# Patient Record
Sex: FEMALE | Race: BLACK OR AFRICAN AMERICAN | NOT HISPANIC OR LATINO
[De-identification: names, ages, dates, MRNs, and addresses within clinical notes are randomized per-mention and may not be internally consistent; named-entity substitution may affect disease eponyms.]

---

## 2019-05-21 PROBLEM — Z00.00 ENCOUNTER FOR PREVENTIVE HEALTH EXAMINATION: Status: ACTIVE | Noted: 2019-05-21

## 2019-05-22 ENCOUNTER — APPOINTMENT (OUTPATIENT)
Dept: NEUROLOGY | Facility: CLINIC | Age: 54
End: 2019-05-22
Payer: MEDICAID

## 2019-05-22 VITALS
DIASTOLIC BLOOD PRESSURE: 80 MMHG | SYSTOLIC BLOOD PRESSURE: 178 MMHG | WEIGHT: 140 LBS | HEIGHT: 64 IN | HEART RATE: 69 BPM | BODY MASS INDEX: 23.9 KG/M2

## 2019-05-22 DIAGNOSIS — Z82.49 FAMILY HISTORY OF ISCHEMIC HEART DISEASE AND OTHER DISEASES OF THE CIRCULATORY SYSTEM: ICD-10-CM

## 2019-05-22 DIAGNOSIS — Z83.3 FAMILY HISTORY OF DIABETES MELLITUS: ICD-10-CM

## 2019-05-22 DIAGNOSIS — Z78.9 OTHER SPECIFIED HEALTH STATUS: ICD-10-CM

## 2019-05-22 DIAGNOSIS — Z86.79 PERSONAL HISTORY OF OTHER DISEASES OF THE CIRCULATORY SYSTEM: ICD-10-CM

## 2019-05-22 DIAGNOSIS — Z82.5 FAMILY HISTORY OF ASTHMA AND OTHER CHRONIC LOWER RESPIRATORY DISEASES: ICD-10-CM

## 2019-05-22 DIAGNOSIS — M25.50 PAIN IN UNSPECIFIED JOINT: ICD-10-CM

## 2019-05-22 DIAGNOSIS — Z86.69 PERSONAL HISTORY OF OTHER DISEASES OF THE NERVOUS SYSTEM AND SENSE ORGANS: ICD-10-CM

## 2019-05-22 DIAGNOSIS — Z86.59 PERSONAL HISTORY OF OTHER MENTAL AND BEHAVIORAL DISORDERS: ICD-10-CM

## 2019-05-22 PROCEDURE — 99205 OFFICE O/P NEW HI 60 MIN: CPT

## 2019-05-22 NOTE — HISTORY OF PRESENT ILLNESS
[FreeTextEntry1] : She is accompanied today by her daughter who is helping to translate as her primary language is Farsi.\par \par Her daughter reports tremor of her head that has been occurring for about two to three years.\par It rarely bothers her. \par Stress/anxiety seems to make it worse.\par She r has not noticed any worsening with caffeine.\par She does not drink alcohol.\par She has no tremors in her hands, balance difficulties or difficulty swallowing. \par She sometimes feels stiff and slow in the morning.\par She believes that her father had similar tremors of the head. \par \par She has difficulty sleeping. She reports that this is worse when she is stressed out.\par She sleeps for about 4-5 hours per night. She drinks a lot of tea throughout the day and is trying to switch it to caffeine free tea. She started taking melatonin which she thinks is helping.\par She is aware of snoring. She denies waking up feeling like she is choking/gasping for air.\par \par Her daughter says that she is forgetful. She will tell a story and hours later she will repeat the same story and not remember that she already told the story.\par \par She lives with her brother and her son. \par She is independent in her ADLs.\par She does not drive. \par She does not get lost or disoriented in familiar places. \par She moved to the  from [place] in 1991. She went through 12th grade in [facility] and had 6 months of college before she got  and had children. \par She has urinary frequency but no urinary incontinence. \par \par She has significant anxiety and has "borderline depression".\par Sometimes she will have panic attacks in the shower.\par She has been resistant to seeking treatment.\par \par She has no history of asthma but she typically has low blood pressure.\par \par She has diffuse body pain and joint pain.

## 2019-05-22 NOTE — REVIEW OF SYSTEMS
[As Noted in HPI] : as noted in HPI [Sleep Disturbances] : sleep disturbances [Anxiety] : anxiety [Depression] : depression [Arthralgias] : arthralgias [Joint Pain] : joint pain [Negative] : Genitourinary [de-identified] : imbalance, tremors of head, incoordination, impaired memory [de-identified] : stressed out

## 2019-05-22 NOTE — CONSULT LETTER
[Dear  ___] : Dear  [unfilled], [Consult Letter:] : I had the pleasure of evaluating your patient, [unfilled]. [Please see my note below.] : Please see my note below. [Consult Closing:] : Thank you very much for allowing me to participate in the care of this patient.  If you have any questions, please do not hesitate to contact me. [FreeTextEntry2] : Joya Acosta [FreeTextEntry3] : Sincerely,\par \par \par Mirella Ryder MD\par Diplomate, American Academy of Psychiatry and Neurology\par Board Certified in the Subspecialty of Clinical Neurophysiology\par Board Certified in the Subspecialty of Sleep Medicine\par Board Certified in the Subspecialty of Epilepsy\par

## 2019-05-22 NOTE — DISCUSSION/SUMMARY
[FreeTextEntry1] : Ms. Ramirez is a 53 year old woman with concerns of head tremor, memory difficulties, anxiety, joint pain and insomnia.\par \par 1. Tremors - Mild head titubation. No evidence of Parkinsonism. The symptoms are not bothersome to her at this time. Will not start propranolol as her daughter reports that blood pressure typically is low.\par Will check TSH and CT brain. \par \par 2. Memory difficulties - short term memory impairment. It is unclear if there is truly a memory disturbance r if it is more related to anxiety.\par She is not willing to have MRI brain. Will get CT scan to rule out hydrocephalus. \par Check TSH, vitamin B1, vitamin B12. Check for sleep apnea.\par \par 3. Snoring and memory impairment. Will get home sleep study to evaluate for RADHAMES.\par \par 4. Insomnia\par We discussed the following recommendations to improve sleep hygiene and insomnia.\par - The bed should only be used for sleep and intimacy. No reading or watching television in bed.\par -  Avoid trying to sleep/go to bed only when sleepy. If you cannot fall asleep at the beginning of the night or in the middle of the night get out of bed after 15 minutes and do something relaxing (read, watch television, etc.)\par -  Wake up at the same time every day.\par -  No naps during the day\par -  No caffeine after noon \par -  Do not watch the clock at night.\par - Avoid direct sunlight late in the day/ wear sunglasses after 4 PM\par - Do not use the computer/tablets for 1-2 hours prior to bedtime\par - Schedule thinking time early in the evening and have relaxation time for one hour before bed\par - Practice relaxation training that can be done at night if you cannot sleep\par - Exercise for 20 minutes in the late afternoon/early evening or take a hot bath 2-4 hours before bedtime\par \par She can continue to use melatonin.\par \par 5. Mood disturbance - she has significant anxiety and some depression. Trial of sertraline 25 mg daily. We discussed potential side effects including possible worsening of mood symptoms/suicidality. Her daughter was instructed to call if this should occur. \par \par 6. Joint pain - will check ESR, CRP, lyme, RF, DALI.\par \par Follow-up in one month, sooner if needed.

## 2019-06-04 ENCOUNTER — FORM ENCOUNTER (OUTPATIENT)
Age: 54
End: 2019-06-04

## 2019-06-05 ENCOUNTER — APPOINTMENT (OUTPATIENT)
Dept: CT IMAGING | Facility: CLINIC | Age: 54
End: 2019-06-05
Payer: MEDICAID

## 2019-06-05 ENCOUNTER — APPOINTMENT (OUTPATIENT)
Dept: NEUROLOGY | Facility: CLINIC | Age: 54
End: 2019-06-05
Payer: MEDICAID

## 2019-06-05 ENCOUNTER — OUTPATIENT (OUTPATIENT)
Dept: OUTPATIENT SERVICES | Facility: HOSPITAL | Age: 54
LOS: 1 days | End: 2019-06-05
Payer: MEDICAID

## 2019-06-05 DIAGNOSIS — Z00.8 ENCOUNTER FOR OTHER GENERAL EXAMINATION: ICD-10-CM

## 2019-06-05 DIAGNOSIS — R41.3 OTHER AMNESIA: ICD-10-CM

## 2019-06-05 PROCEDURE — 70450 CT HEAD/BRAIN W/O DYE: CPT | Mod: 26

## 2019-06-05 PROCEDURE — 95806 SLEEP STUDY UNATT&RESP EFFT: CPT

## 2019-06-05 PROCEDURE — 70450 CT HEAD/BRAIN W/O DYE: CPT

## 2019-06-14 ENCOUNTER — APPOINTMENT (OUTPATIENT)
Dept: NEUROLOGY | Facility: CLINIC | Age: 54
End: 2019-06-14
Payer: MEDICAID

## 2019-06-14 VITALS
DIASTOLIC BLOOD PRESSURE: 70 MMHG | BODY MASS INDEX: 23.56 KG/M2 | HEART RATE: 64 BPM | HEIGHT: 64 IN | WEIGHT: 138 LBS | SYSTOLIC BLOOD PRESSURE: 142 MMHG

## 2019-06-14 DIAGNOSIS — R06.83 SNORING: ICD-10-CM

## 2019-06-14 PROCEDURE — 99214 OFFICE O/P EST MOD 30 MIN: CPT

## 2019-06-14 NOTE — DATA REVIEWED
[de-identified] : Blood tests 5/22/19: ESR 27, HbA1C 6, TSH 2.04, free T4 1.01, T3 90, DALI 0.1, RF 8.1, Lyme negative, C-RP less than 0.4, dsDNA negative\par \par CT head 6/5/19: no evidence of acute hemorrhage, mass or mass effect.\par \par Home sleep study 6/5/19: AHI 20.8. positional influence did not appear to be a significant factor.

## 2019-06-14 NOTE — DISCUSSION/SUMMARY
[FreeTextEntry1] : Ms. Ramirez is a 53 year old woman with concerns of head tremor, memory difficulties, anxiety, joint pain and insomnia.\par \par 1. Tremors - Mild head titubation. No evidence of Parkinsonism. Symptoms have improved, likely due to improvement in anxiety. TSH normal. Will continue to monitor.\par \par 2. Memory difficulties - CT head and memory related labs are unremarkable. We can consider MRI brain in the future (at this time she is reluctant).\par She does have obstructive sleep apnea which we discussed can contribute to memory impairment. this will be addressed. \par \par \par 3. Obstructive sleep apnea - Her home sleep study showed evidence of a moderate degree of RADHAMES.\par We discussed the risks of untreated sleep apnea including sleepiness, hypertension, increased risk for heart disease and stroke, worsening seizure frequency and cognitive impairment.\par We discussed different treatments for RADHAMES including CPAP, oral appliance, surgery and weight loss. \par She is not a candidate for an oral appliance due to the fact that she wears dentures.\par She is willing to try auto PAP. This will be ordered through [facility].\par If she does not tolerate PAP, ENT evaluation can be considered. \par \par 4. Insomnia - Currently much improved, likely due to improvement in anxiety. \par \par 5. Mood disturbance - Continue low dose sertraline - 25 mg daily which seems to be providing benefit. The dose can be increased if needed. \par \par \par Follow-up in two months, sooner if needed. \par

## 2019-06-14 NOTE — PHYSICAL EXAM
[FreeTextEntry1] : Examination:\par Constitutional: normal, no apparent distress\par Eyes: normal conjunctiva b/l, no ptosis, visual fields full\par oropharynx: narrow\par Respiratory: no respiratory distress, normal effort, normal auscultation\par Cardiovascular: normal rate, rhythm, no murmurs\par Neck: supple, no masses\par Vascular: carotids normal\par Skin: normal color, no rashes\par Psych: normal mood, affect\par \par Neurological:\par Memory: normal memory, oriented to person, place, time\par Language intact/no aphasia\par Cranial Nerves: II-XII intact, Pupils equally round and reactive to light, ocular muscles/movements intact, no ptosis, no facial weakness, tongue protrudes normally in the midline, \par Motor: normal tone, no pronator drift, full strength in all four extremities in the proximal and distal muscle groups\par Coordination: Fine motor movements intact, rapid alternating movements intact, finger to nose intact bilaterally\par Sensory: intact to light touch\par DTRs: symmetric, normal\par Gait: narrow based, steady\par

## 2019-06-14 NOTE — HISTORY OF PRESENT ILLNESS
[FreeTextEntry1] : I last saw Ms. Ramirez on 5/22/19.\par She us accompanied today by her daughter. \par She says that she initially had some headaches when she first started sertraline but the headaches are now improved.\par Her daughter thinks that she is less anxious.\par She is sleeping a lot more. She is able to sleep from 9-10 PM until 6 AM.\par Her daughter thinks that tremors have been less frequent and she agrees that this is the case. \par She continues to have knee pain, joint pain.

## 2019-06-14 NOTE — CONSULT LETTER
[Dear  ___] : Dear  [unfilled], [Please see my note below.] : Please see my note below. [Consult Letter:] : I had the pleasure of evaluating your patient, [unfilled]. [Consult Closing:] : Thank you very much for allowing me to participate in the care of this patient.  If you have any questions, please do not hesitate to contact me. [FreeTextEntry2] : Joya Acosta [FreeTextEntry3] : Sincerely,\par \par \par Mirella Ryder MD\par Diplomate, American Academy of Psychiatry and Neurology\par Board Certified in the Subspecialty of Clinical Neurophysiology\par Board Certified in the Subspecialty of Sleep Medicine\par Board Certified in the Subspecialty of Epilepsy\par

## 2019-08-19 ENCOUNTER — APPOINTMENT (OUTPATIENT)
Dept: NEUROLOGY | Facility: CLINIC | Age: 54
End: 2019-08-19
Payer: MEDICAID

## 2019-08-19 VITALS
DIASTOLIC BLOOD PRESSURE: 83 MMHG | BODY MASS INDEX: 23.56 KG/M2 | HEIGHT: 64 IN | SYSTOLIC BLOOD PRESSURE: 131 MMHG | WEIGHT: 138 LBS | HEART RATE: 58 BPM

## 2019-08-19 DIAGNOSIS — G47.00 INSOMNIA, UNSPECIFIED: ICD-10-CM

## 2019-08-19 PROCEDURE — 99213 OFFICE O/P EST LOW 20 MIN: CPT

## 2019-08-19 NOTE — DATA REVIEWED
[de-identified] : Blood tests 5/22/19: ESR 27, HbA1C 6, TSH 2.04, free T4 1.01, T3 90, DALI 0.1, RF 8.1, Lyme negative, C-RP less than 0.4, dsDNA negative\par \par CT head 6/5/19: no evidence of acute hemorrhage, mass or mass effect.\par \par Home sleep study 6/5/19: AHI 20.8. positional influence did not appear to be a significant factor.

## 2019-08-19 NOTE — DISCUSSION/SUMMARY
[FreeTextEntry1] : Ms. Ramirez is a 53 year old woman with concerns of head tremor, memory difficulties, anxiety, joint pain and insomnia.\par \par 1. Tremors - Mild head titubation. No evidence of Parkinsonism. Symptoms have improved, likely due to improvement in anxiety. TSH normal. Will continue to monitor.\par \par 2. Memory difficulties - CT head and memory related labs are unremarkable. We can consider MRI brain in the future (at this time she is reluctant).\par She does have obstructive sleep apnea which we discussed can contribute to memory impairment. this will be addressed. \par \par 3. Obstructive sleep apnea - She is having difficulty tolerating APAP with her nasal mask.\par I am ordering a full face mask. We discussed ways to help acclimate to PAP use.\par \par \par 4. Insomnia - Currently much improved, likely due to improvement in anxiety. \par \par 5. Mood disturbance - Continue low dose sertraline - 25 mg daily which seems to be providing benefit. The dose can be increased if needed. \par \par \par Follow-up in two to three months, sooner if needed. \par

## 2019-08-19 NOTE — HISTORY OF PRESENT ILLNESS
[FreeTextEntry1] : I last saw Ms. Ramirez on 6/14/19.\par She is accompanied by her son.\par Her compliance data shows that she is using her APAP machine for an average of 1 hour 46 minutes and the average AHI  is 11.2.\par She says that the mask does not fit properly.\par The mask falls off during the night.\par She feels as if there is no air coming through it. \par She wakes up 4-5 times during the night.\par \par \par Her anxiety is improved as well as her headaches.\par She finds that the medication is helping.\par Tremors are improved overall. She rarely notices these. \par \par

## 2019-11-20 ENCOUNTER — APPOINTMENT (OUTPATIENT)
Dept: NEUROLOGY | Facility: CLINIC | Age: 54
End: 2019-11-20

## 2019-12-19 ENCOUNTER — APPOINTMENT (OUTPATIENT)
Dept: NEUROLOGY | Facility: CLINIC | Age: 54
End: 2019-12-19
Payer: MEDICAID

## 2019-12-19 VITALS — SYSTOLIC BLOOD PRESSURE: 158 MMHG | HEART RATE: 78 BPM | DIASTOLIC BLOOD PRESSURE: 76 MMHG

## 2019-12-19 VITALS — BODY MASS INDEX: 23.39 KG/M2 | WEIGHT: 137 LBS | HEIGHT: 64 IN

## 2019-12-19 DIAGNOSIS — R51 HEADACHE: ICD-10-CM

## 2019-12-19 PROCEDURE — 99213 OFFICE O/P EST LOW 20 MIN: CPT

## 2019-12-19 NOTE — DATA REVIEWED
[de-identified] : Blood tests 5/22/19: ESR 27, HbA1C 6, TSH 2.04, free T4 1.01, T3 90, DALI 0.1, RF 8.1, Lyme negative, C-RP less than 0.4, dsDNA negative\par \par CT head 6/5/19: no evidence of acute hemorrhage, mass or mass effect.\par \par Home sleep study 6/5/19: AHI 20.8. positional influence did not appear to be a significant factor.

## 2019-12-19 NOTE — HISTORY OF PRESENT ILLNESS
[FreeTextEntry1] : I last saw Ms. Ramirez on 8/19/19.\par She is accompanied by her daughter and her grandson.\par \par She has not been using her APAP machine. The average daily usage is 1 minute.\par She feels that the machine delivers cold air and it gives her a stabbing headache.\par Her daughter says that they have tried multiple different masks and different head gear.\par She is using a nasal pillow mask.\par \par She is not having headaches during the day.\par \par She stopped taking sertraline. Her family noticed an improvement in anxiety but she did not think that it helped so she stopped.

## 2019-12-19 NOTE — DISCUSSION/SUMMARY
[FreeTextEntry1] : Ms. Ramirez is a 53 year old woman with concerns of head tremor, memory difficulties, anxiety, joint pain and insomnia.\par \par 1. Tremors - Mild head titubation. No evidence of Parkinsonism. Symptoms have improved, likely due to improvement in anxiety. TSH normal. Will continue to monitor.\par \par 2. Memory difficulties - CT head and memory related labs are unremarkable. We can consider MRI brain in the future (at this time she is reluctant).\par She does have obstructive sleep apnea which we discussed can contribute to memory impairment. this will be addressed. \par \par 3. Obstructive sleep apnea - She is having difficulty tolerating APAP. She reports that the humidification is cool and this is causing headaches. She was also helping her daughter in [place] after the birth of her child for three weeks and left her machine at home.\par I am ordering heated tubing and asking the [employer] company to check to make sure that her humidification is warm and not cool. If there is a coverage issue due to noncompliance she may have to return the machine and then start over.\par She is not a candidate for an oral appliance as she has dentures.\par \par \par 4. Insomnia - Currently much improved, likely due to improvement in anxiety. \par \par 5. Mood disturbance - She discontinued sertraline on her own.\par She will let me know if she would like to restart this but reports that her mood is improved at this time. \par \par \par Follow-up in 3-4 months, sooner if needed. Daughter can call with any questions/concerns.\par  \par

## 2020-08-21 ENCOUNTER — APPOINTMENT (OUTPATIENT)
Dept: NEUROLOGY | Facility: CLINIC | Age: 55
End: 2020-08-21
Payer: MEDICAID

## 2020-08-21 VITALS
DIASTOLIC BLOOD PRESSURE: 84 MMHG | SYSTOLIC BLOOD PRESSURE: 157 MMHG | BODY MASS INDEX: 23.05 KG/M2 | WEIGHT: 135 LBS | HEART RATE: 65 BPM | TEMPERATURE: 98 F | HEIGHT: 64 IN

## 2020-08-21 DIAGNOSIS — R41.89 OTHER SYMPTOMS AND SIGNS INVOLVING COGNITIVE FUNCTIONS AND AWARENESS: ICD-10-CM

## 2020-08-21 DIAGNOSIS — R25.1 TREMOR, UNSPECIFIED: ICD-10-CM

## 2020-08-21 DIAGNOSIS — F41.9 ANXIETY DISORDER, UNSPECIFIED: ICD-10-CM

## 2020-08-21 DIAGNOSIS — R41.3 OTHER AMNESIA: ICD-10-CM

## 2020-08-21 PROCEDURE — 99214 OFFICE O/P EST MOD 30 MIN: CPT

## 2020-08-21 RX ORDER — SERTRALINE 25 MG/1
25 TABLET, FILM COATED ORAL DAILY
Qty: 30 | Refills: 3 | Status: DISCONTINUED | COMMUNITY
Start: 2019-05-22 | End: 2020-08-21

## 2020-08-21 RX ORDER — PROPRANOLOL HYDROCHLORIDE 20 MG/1
20 TABLET ORAL
Qty: 60 | Refills: 3 | Status: ACTIVE | COMMUNITY
Start: 2020-08-21 | End: 1900-01-01

## 2020-08-21 NOTE — PHYSICAL EXAM
[FreeTextEntry1] : Examination:\par Constitutional: normal, no apparent distress\par Eyes: normal conjunctiva b/l, no ptosis, visual fields full\par Respiratory: no respiratory distress, normal effort, normal auscultation\par Cardiovascular: normal rate, rhythm, no murmurs\par Neck: supple, no masses\par Vascular: carotids normal\par Skin: normal color, no rashes\par Psych: normal mood, affect\par \par Neurological:\par Memory: oriented to person, place, month, year\par Language intact/no aphasia\par Cranial Nerves: II-XII intact, Pupils equally round and reactive to light, ocular muscles/movements intact, no ptosis, no facial weakness, tongue protrudes normally in the midline, \par Motor: normal tone, no pronator drift, full strength in all four extremities in the proximal and distal muscle groups\par Coordination: Fine motor movements intact, rapid alternating movements intact, finger to nose intact bilaterally\par Sensory: intact to light touch\par DTRs: symmetric, normal\par Gait: narrow based, steady\par

## 2020-08-21 NOTE — HISTORY OF PRESENT ILLNESS
[FreeTextEntry1] : I last saw Ms. Ramirez on 12/19/19.\par She is here today with her daughter who is translating.\par \par She was going to work until March until she was furloughed. 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[employer].\par Her daughter says that when she was going to work she was very forgetful.\par She has misplaced her cell phone and her wallet.\par \par She says that she has shortness of breath while wearing a mask.\par \par She reports a feeling of vertigo.\par She has shaking of her head which her daughter thinks correlates with anxiety or being upset.\par \par She has not been compliant with APAP use.\par Average usage on days used is about 1.5 hours.\par The average AHI is 12.4 with about one hour per night spent with large leak.\par \par She complains that it is too uncomfortable.\par She says that there is not enough humidification.

## 2020-08-21 NOTE — DISCUSSION/SUMMARY
[FreeTextEntry1] : Ms. Ramirez is a 55 year old woman with concerns of head tremor, memory difficulties, anxiety, and sleep difficulties.\par \par 1. Tremors - Mild head titubation. No evidence of Parkinsonism. Seems to fluctuate with anxiety. \par Repeat labs\par Trial of very low dose propranolol - 20 mg BID to help with anxieyt and tremors. Discussed potential side effects including dizziness, lightheadedness.\par \par 2. Memory difficulties - CT head and memory related labs are unremarkable. \par Will get MRI brain.\par Needs to treat RADHAMES.\par \par 3. Obstructive sleep apnea - She is having difficulty tolerating APAP. Daughter will check on APAP machine and increase humidification.\par She did receive the heated tubing.\par She is not a candidate for an oral appliance as she has dentures.\par \par 4. Insomnia - Currently much improved. She sleeps for about 7 hours per night.\par \par 5. Mood disturbance - She discontinued sertraline on her own.\par She says it caused headaches.\par \par Follow-up in 3, sooner if needed. Daughter can call with any questions/concerns.

## 2020-08-21 NOTE — DATA REVIEWED
[de-identified] : Blood tests 5/22/19: ESR 27, HbA1C 6, TSH 2.04, free T4 1.01, T3 90, DALI 0.1, RF 8.1, Lyme negative, C-RP less than 0.4, dsDNA negative\par \par CT head 6/5/19: no evidence of acute hemorrhage, mass or mass effect.\par \par Home sleep study 6/5/19: AHI 20.8. positional influence did not appear to be a significant factor.

## 2020-09-04 DIAGNOSIS — E78.00 PURE HYPERCHOLESTEROLEMIA, UNSPECIFIED: ICD-10-CM

## 2020-09-04 LAB
25(OH)D3 SERPL-MCNC: 10.9 NG/ML
ALBUMIN SERPL ELPH-MCNC: 4.5 G/DL
ALP BLD-CCNC: 60 U/L
ALT SERPL-CCNC: 25 U/L
ANION GAP SERPL CALC-SCNC: 14 MMOL/L
AST SERPL-CCNC: 23 U/L
BASOPHILS # BLD AUTO: 0.05 K/UL
BASOPHILS NFR BLD AUTO: 0.6 %
BILIRUB SERPL-MCNC: 0.2 MG/DL
BUN SERPL-MCNC: 18 MG/DL
CALCIUM SERPL-MCNC: 9.4 MG/DL
CHLORIDE SERPL-SCNC: 105 MMOL/L
CHOLEST SERPL-MCNC: 207 MG/DL
CHOLEST/HDLC SERPL: 5 RATIO
CO2 SERPL-SCNC: 23 MMOL/L
CREAT SERPL-MCNC: 0.95 MG/DL
EOSINOPHIL # BLD AUTO: 0.12 K/UL
EOSINOPHIL NFR BLD AUTO: 1.5 %
GLUCOSE SERPL-MCNC: 128 MG/DL
HCT VFR BLD CALC: 38.4 %
HDLC SERPL-MCNC: 42 MG/DL
HGB BLD-MCNC: 11.5 G/DL
IMM GRANULOCYTES NFR BLD AUTO: 0.4 %
LDLC SERPL CALC-MCNC: 121 MG/DL
LYMPHOCYTES # BLD AUTO: 2.17 K/UL
LYMPHOCYTES NFR BLD AUTO: 27.5 %
MAN DIFF?: NORMAL
MCHC RBC-ENTMCNC: 26.3 PG
MCHC RBC-ENTMCNC: 29.9 GM/DL
MCV RBC AUTO: 87.9 FL
MONOCYTES # BLD AUTO: 0.54 K/UL
MONOCYTES NFR BLD AUTO: 6.8 %
NEUTROPHILS # BLD AUTO: 4.99 K/UL
NEUTROPHILS NFR BLD AUTO: 63.2 %
PLATELET # BLD AUTO: 398 K/UL
POTASSIUM SERPL-SCNC: 4.2 MMOL/L
PROT SERPL-MCNC: 6.8 G/DL
RBC # BLD: 4.37 M/UL
RBC # FLD: 15.4 %
SODIUM SERPL-SCNC: 141 MMOL/L
TRIGL SERPL-MCNC: 219 MG/DL
TSH SERPL-ACNC: 2.02 UIU/ML
VIT B12 SERPL-MCNC: 804 PG/ML
WBC # FLD AUTO: 7.9 K/UL

## 2020-09-10 ENCOUNTER — OUTPATIENT (OUTPATIENT)
Dept: OUTPATIENT SERVICES | Facility: HOSPITAL | Age: 55
LOS: 1 days | End: 2020-09-10
Payer: MEDICAID

## 2020-09-10 ENCOUNTER — APPOINTMENT (OUTPATIENT)
Dept: MRI IMAGING | Facility: CLINIC | Age: 55
End: 2020-09-10
Payer: MEDICAID

## 2020-09-10 ENCOUNTER — RESULT REVIEW (OUTPATIENT)
Age: 55
End: 2020-09-10

## 2020-09-10 DIAGNOSIS — R25.1 TREMOR, UNSPECIFIED: ICD-10-CM

## 2020-09-10 PROCEDURE — 70551 MRI BRAIN STEM W/O DYE: CPT | Mod: 26

## 2020-09-10 PROCEDURE — 70551 MRI BRAIN STEM W/O DYE: CPT

## 2020-10-07 ENCOUNTER — APPOINTMENT (OUTPATIENT)
Dept: NEUROLOGY | Facility: CLINIC | Age: 55
End: 2020-10-07

## 2020-10-30 ENCOUNTER — APPOINTMENT (OUTPATIENT)
Dept: NEUROLOGY | Facility: CLINIC | Age: 55
End: 2020-10-30

## 2021-03-01 ENCOUNTER — NON-APPOINTMENT (OUTPATIENT)
Age: 56
End: 2021-03-01

## 2021-07-08 ENCOUNTER — APPOINTMENT (OUTPATIENT)
Dept: ORTHOPEDIC SURGERY | Facility: CLINIC | Age: 56
End: 2021-07-08
Payer: MEDICAID

## 2021-07-15 ENCOUNTER — NON-APPOINTMENT (OUTPATIENT)
Age: 56
End: 2021-07-15

## 2021-07-15 ENCOUNTER — APPOINTMENT (OUTPATIENT)
Dept: ORTHOPEDIC SURGERY | Facility: CLINIC | Age: 56
End: 2021-07-15
Payer: MEDICAID

## 2021-07-15 DIAGNOSIS — S92.919A UNSPECIFIED FRACTURE OF UNSPECIFIED TOE(S), INITIAL ENCOUNTER FOR CLOSED FRACTURE: ICD-10-CM

## 2021-07-15 PROCEDURE — 26720 TREAT FINGER FRACTURE EACH: CPT | Mod: LT

## 2021-07-15 PROCEDURE — 73630 X-RAY EXAM OF FOOT: CPT | Mod: LT

## 2021-07-15 PROCEDURE — 99204 OFFICE O/P NEW MOD 45 MIN: CPT | Mod: 57

## 2023-04-11 ENCOUNTER — APPOINTMENT (OUTPATIENT)
Dept: PULMONOLOGY | Facility: CLINIC | Age: 58
End: 2023-04-11
Payer: MEDICAID

## 2023-04-11 VITALS
OXYGEN SATURATION: 98 % | HEIGHT: 62 IN | SYSTOLIC BLOOD PRESSURE: 139 MMHG | DIASTOLIC BLOOD PRESSURE: 81 MMHG | WEIGHT: 132 LBS | BODY MASS INDEX: 24.29 KG/M2 | HEART RATE: 61 BPM | TEMPERATURE: 96.2 F

## 2023-04-11 PROCEDURE — 71046 X-RAY EXAM CHEST 2 VIEWS: CPT

## 2023-04-11 PROCEDURE — 99204 OFFICE O/P NEW MOD 45 MIN: CPT | Mod: 25

## 2023-04-11 RX ORDER — PREDNISONE 20 MG/1
20 TABLET ORAL DAILY
Qty: 14 | Refills: 0 | Status: ACTIVE | COMMUNITY
Start: 2023-04-11 | End: 1900-01-01

## 2023-04-11 RX ORDER — BENZONATATE 200 MG/1
200 CAPSULE ORAL 3 TIMES DAILY
Qty: 60 | Refills: 2 | Status: ACTIVE | COMMUNITY
Start: 2023-04-11 | End: 1900-01-01

## 2023-04-11 RX ORDER — BUDESONIDE AND FORMOTEROL FUMARATE DIHYDRATE 160; 4.5 UG/1; UG/1
160-4.5 AEROSOL RESPIRATORY (INHALATION)
Qty: 1 | Refills: 6 | Status: ACTIVE | COMMUNITY
Start: 2023-04-11 | End: 1900-01-01

## 2023-04-11 NOTE — ADDENDUM
[FreeTextEntry1] : Included in this visit:\par Pre-visit preparation\par reviewing all notes, records and prior consultations\par reviewing all appropriate labs and imaging\par performing all necessary physical exam and diagnostic testing\par Consulting patient on their condition and plan of care\par Coordination of care\par

## 2023-04-11 NOTE — ASSESSMENT
[FreeTextEntry1] : 57F with chronic bronchitis developed after COVID 2 months ago.\par On exam, with expiratory wheezing, could be bronchospasm vs postviral.\par will trial on 7d course of prednisone and start Symbicort 2 puffs BID\par Benzonatate for cough\par cont nightly CPAP\par RTC in 1 month\par

## 2023-04-11 NOTE — CONSULT LETTER
[Dear  ___] : Dear  [unfilled], [Consult Letter:] : I had the pleasure of evaluating your patient, [unfilled]. [Please see my note below.] : Please see my note below. [Consult Closing:] : Thank you very much for allowing me to participate in the care of this patient.  If you have any questions, please do not hesitate to contact me. [Sincerely,] : Sincerely, [FreeTextEntry2] : Joya Acosta DO\par [facility]\par [address]\[place] [address] [FreeTextEntry3] : Lorene Middleton MD, FCCP\par Chief, Pulmonary Medicine\par [facility]\par [facility]\par  of Medicine\par [employer] [facility]\par \par

## 2023-04-11 NOTE — HISTORY OF PRESENT ILLNESS
[TextBox_4] : 57F here for evaluation of cough, referred by Dr Joya Acosta. Pt had COVID in February, not hospitalized but reported cough and congestion, took Medrol pack and abx, tried inhalers. She continues to cough and feels very congested, brings up mostly whitish clear phlegm. no fevers. She has no h/o allergies or asthma, her mom has asthma. Reports some wheezing and VALE. Prior to covid, pt never had a respiratory problems. Nonsmoker.\par Has a h/o RADHAMES and uses CPAP nightly\par

## 2023-04-11 NOTE — PHYSICAL EXAM
[No Acute Distress] : no acute distress [Normal Oropharynx] : normal oropharynx [Normal Appearance] : normal appearance [No Neck Mass] : no neck mass [Normal Rate/Rhythm] : normal rate/rhythm [Normal S1, S2] : normal s1, s2 [No Murmurs] : no murmurs [No Resp Distress] : no resp distress [No Abnormalities] : no abnormalities [Benign] : benign [Normal Gait] : normal gait [No Clubbing] : no clubbing [No Cyanosis] : no cyanosis [No Edema] : no edema [FROM] : FROM [Normal Color/ Pigmentation] : normal color/ pigmentation [No Focal Deficits] : no focal deficits [Oriented x3] : oriented x3 [Normal Affect] : normal affect [TextBox_68] : scattered end expiratory wheezes

## 2023-04-25 ENCOUNTER — APPOINTMENT (OUTPATIENT)
Dept: PULMONOLOGY | Facility: CLINIC | Age: 58
End: 2023-04-25
Payer: MEDICAID

## 2023-04-25 VITALS
WEIGHT: 132 LBS | TEMPERATURE: 97.2 F | DIASTOLIC BLOOD PRESSURE: 72 MMHG | OXYGEN SATURATION: 100 % | HEIGHT: 62 IN | BODY MASS INDEX: 24.29 KG/M2 | HEART RATE: 58 BPM | SYSTOLIC BLOOD PRESSURE: 137 MMHG

## 2023-04-25 DIAGNOSIS — G47.33 OBSTRUCTIVE SLEEP APNEA (ADULT) (PEDIATRIC): ICD-10-CM

## 2023-04-25 DIAGNOSIS — J42 UNSPECIFIED CHRONIC BRONCHITIS: ICD-10-CM

## 2023-04-25 PROCEDURE — 99214 OFFICE O/P EST MOD 30 MIN: CPT

## 2023-05-01 PROBLEM — G47.33 OBSTRUCTIVE SLEEP APNEA: Status: ACTIVE | Noted: 2019-06-14

## 2023-05-01 PROBLEM — J42 CHRONIC BRONCHITIS: Status: ACTIVE | Noted: 2023-04-11

## 2023-05-01 NOTE — ASSESSMENT
[FreeTextEntry1] : 57F with chronic bronchitis developed after COVID 2 months ago.\par clinically improved with prednisone\par s/p prednisone\par cont on Symbicort 2 puffs BID\par Benzonatate for cough\par cont nightly CPAP\par RTC in 1 month\par

## 2023-05-01 NOTE — CONSULT LETTER
[Dear  ___] : Dear  [unfilled], [Please see my note below.] : Please see my note below. [Consult Letter:] : I had the pleasure of evaluating your patient, [unfilled]. [Consult Closing:] : Thank you very much for allowing me to participate in the care of this patient.  If you have any questions, please do not hesitate to contact me. [Sincerely,] : Sincerely, [FreeTextEntry2] : Joya Acosta DO\par [facility]\par [address]\[place] [address] [FreeTextEntry3] : Lorene Middleton MD, FCCP\par Chief, Pulmonary Medicine\par [facility]\par [facility]\par  of Medicine\par [employer] [facility]\par \par